# Patient Record
Sex: MALE | Race: WHITE | NOT HISPANIC OR LATINO | Employment: UNEMPLOYED | URBAN - NONMETROPOLITAN AREA
[De-identification: names, ages, dates, MRNs, and addresses within clinical notes are randomized per-mention and may not be internally consistent; named-entity substitution may affect disease eponyms.]

---

## 2023-10-09 LAB
NON-UH HIE ANION GAP: 11.4 (ref 6–15)
NON-UH HIE BLOOD UREA NITROGEN: 22 MG/DL (ref 7–25)
NON-UH HIE CALCIUM: 9.4 MG/DL (ref 8.6–10.3)
NON-UH HIE CARBON DIOXIDE: 29 MMOL/L (ref 21–31)
NON-UH HIE CHLORIDE: 103 MMOL/L (ref 98–107)
NON-UH HIE CREATININE: 1.33 MG/DL (ref 0.7–1.3)
NON-UH HIE ESTIMATED GFR: > 60
NON-UH HIE GLUCOSE: 114 MG/DL (ref 70–100)
NON-UH HIE POTASSIUM: 4.4 MMOL/L (ref 3.5–5.1)
NON-UH HIE SODIUM: 139 MMOL/L (ref 136–145)

## 2023-10-28 PROBLEM — Z79.899 HIGH RISK MEDICATION USE: Status: ACTIVE | Noted: 2023-10-28

## 2023-10-28 PROBLEM — G47.30 SLEEP APNEA: Status: ACTIVE | Noted: 2023-10-28

## 2023-10-28 PROBLEM — I10 PRIMARY HYPERTENSION: Status: ACTIVE | Noted: 2023-10-28

## 2023-10-28 PROBLEM — Z86.79 ATRIAL FIBRILLATION, CURRENTLY IN SINUS RHYTHM: Status: ACTIVE | Noted: 2023-10-28

## 2023-10-28 PROBLEM — E66.9 CLASS 2 OBESITY WITH BODY MASS INDEX (BMI) OF 36.0 TO 36.9 IN ADULT: Status: ACTIVE | Noted: 2023-10-28

## 2023-10-28 PROBLEM — I42.0 CARDIOMYOPATHY, DILATED, NONISCHEMIC (MULTI): Status: ACTIVE | Noted: 2023-10-28

## 2023-10-28 PROBLEM — Z78.9 NEVER SMOKED ANY SUBSTANCE: Status: ACTIVE | Noted: 2023-10-28

## 2023-10-28 PROBLEM — Z79.01 ANTICOAGULATED: Status: ACTIVE | Noted: 2023-10-28

## 2023-10-28 PROBLEM — R06.00 DYSPNEA: Status: ACTIVE | Noted: 2023-10-28

## 2023-10-28 PROBLEM — E66.812 CLASS 2 OBESITY WITH BODY MASS INDEX (BMI) OF 36.0 TO 36.9 IN ADULT: Status: ACTIVE | Noted: 2023-10-28

## 2023-10-28 RX ORDER — METOPROLOL SUCCINATE 100 MG/1
100 TABLET, EXTENDED RELEASE ORAL DAILY
COMMUNITY
End: 2023-10-30 | Stop reason: SDUPTHER

## 2023-10-28 RX ORDER — LOSARTAN POTASSIUM 100 MG/1
100 TABLET ORAL DAILY
COMMUNITY
End: 2023-10-30 | Stop reason: SDUPTHER

## 2023-10-28 RX ORDER — SPIRONOLACTONE 25 MG/1
0.5 TABLET ORAL DAILY
COMMUNITY
End: 2023-10-30 | Stop reason: SDUPTHER

## 2023-10-30 ENCOUNTER — OFFICE VISIT (OUTPATIENT)
Dept: CARDIOLOGY | Facility: CLINIC | Age: 43
End: 2023-10-30
Payer: COMMERCIAL

## 2023-10-30 VITALS
WEIGHT: 294 LBS | SYSTOLIC BLOOD PRESSURE: 132 MMHG | HEART RATE: 58 BPM | DIASTOLIC BLOOD PRESSURE: 84 MMHG | HEIGHT: 73 IN | BODY MASS INDEX: 38.97 KG/M2

## 2023-10-30 DIAGNOSIS — R73.9 HYPERGLYCEMIA: ICD-10-CM

## 2023-10-30 DIAGNOSIS — Z79.01 ANTICOAGULATED: ICD-10-CM

## 2023-10-30 DIAGNOSIS — Z79.899 MEDICATION COURSE CHANGED: ICD-10-CM

## 2023-10-30 DIAGNOSIS — Z86.79 ATRIAL FIBRILLATION, CURRENTLY IN SINUS RHYTHM: Primary | ICD-10-CM

## 2023-10-30 DIAGNOSIS — I10 PRIMARY HYPERTENSION: ICD-10-CM

## 2023-10-30 DIAGNOSIS — Z79.899 HIGH RISK MEDICATION USE: ICD-10-CM

## 2023-10-30 DIAGNOSIS — I42.0 CARDIOMYOPATHY, DILATED, NONISCHEMIC (MULTI): ICD-10-CM

## 2023-10-30 DIAGNOSIS — E66.01 CLASS 2 SEVERE OBESITY DUE TO EXCESS CALORIES WITH SERIOUS COMORBIDITY AND BODY MASS INDEX (BMI) OF 36.0 TO 36.9 IN ADULT (MULTI): ICD-10-CM

## 2023-10-30 PROCEDURE — 1036F TOBACCO NON-USER: CPT | Performed by: INTERNAL MEDICINE

## 2023-10-30 PROCEDURE — 3075F SYST BP GE 130 - 139MM HG: CPT | Performed by: INTERNAL MEDICINE

## 2023-10-30 PROCEDURE — 3079F DIAST BP 80-89 MM HG: CPT | Performed by: INTERNAL MEDICINE

## 2023-10-30 PROCEDURE — 3008F BODY MASS INDEX DOCD: CPT | Performed by: INTERNAL MEDICINE

## 2023-10-30 PROCEDURE — 99214 OFFICE O/P EST MOD 30 MIN: CPT | Performed by: INTERNAL MEDICINE

## 2023-10-30 RX ORDER — LOSARTAN POTASSIUM 100 MG/1
100 TABLET ORAL DAILY
Qty: 90 TABLET | Refills: 3 | Status: SHIPPED | OUTPATIENT
Start: 2023-10-30 | End: 2024-10-29

## 2023-10-30 RX ORDER — METOPROLOL SUCCINATE 100 MG/1
100 TABLET, EXTENDED RELEASE ORAL DAILY
Qty: 90 TABLET | Refills: 3 | Status: SHIPPED | OUTPATIENT
Start: 2023-10-30 | End: 2024-10-29

## 2023-10-30 RX ORDER — SPIRONOLACTONE 25 MG/1
12.5 TABLET ORAL DAILY
Qty: 45 TABLET | Refills: 3 | Status: SHIPPED | OUTPATIENT
Start: 2023-10-30 | End: 2024-10-29

## 2023-10-30 NOTE — PROGRESS NOTES
Last seen 5/2013 :    Subjective :     Interval review of systems is negative for chest discomfort pressure tightness heaviness palpitations lightheadedness orthopnea paroxysmal nocturnal dyspnea dependent edema or claudication TIA or CVA type symptoms or bleeding diathesis      History so Far :  1.EZEQUIEL guided electrical cardioversion of atrial fibrillation 2/23 in the setting of class III/IV biventricular systolic heart failure  2. Patient is currently functional class I  3. Atrial fibrillation with rapid ventricular rate, no recurrence on current medical therapy  4. Atrial fibrillation is a new diagnosis.  5. No regular medical care in the past  6. Uncontrolled hypertension  7. Probable obstructive sleep apnea  8. High risk medication-Eliquis  9. Financial constraints  10. Still volume overloaded on examination.  11. Type II myocardial infarction-subsequent episode of care  12. Increased BMI  13. Echocardiogram 2/9/2023-left atrial diameter 4.9 cm LV end-systolic dimension 4.8 cm patient was in atrial fibrillation during the study LVEF 25% with severe global hypokinesis moderate right ventricular systolic dysfunction grossly normal valves no pericardial effusion PA pressure not estimated.  14. Transesophageal echocardiogram February 2023-  15. Patient was electrically cardioverted to sinus rhythm using 200 J of synchronized DC current.  16. CKD stage IIIa, likely to improve with down titration of diuretic therapy.  17. Echocardiogram April 2023-LVEF 65% mild to moderate concentric left ventricular hypertrophy, LVEF improved from 25% in February 2023 up to 65%. Right ventricular systolic dysfunction noted previously has also resolved, and atrial fibrillation is longer no longer present. Left atrial diameter 3.9 cm LV end-systolic diameter 3.7 cm, trace mitral and tricuspid regurgitation no pericardial effusion IVC normal    Objective      Wt Readings from Last 3 Encounters:   10/30/23 133 kg (294 lb)   05/08/23  125 kg (276 lb)   03/13/23 131 kg (288 lb)            Physical Exam:    GENERAL APPEARANCE: in no acute distress.  CHEST: Symmetric and non-tender.  INTEGUMENT: Skin warm and dry  HEENT: No gross abnormalities identified.No pallor or scleral icterus.  NECK: Supple, no JVD, no bruit.   NEURO/PSHCY: Alert and oriented x3; appropriate behavior and responses and responses  LUNGS: Clear to auscultation bilaterally; normal respiratory effort.  HEART: Rate and rhythm regular with no evident murmur; no gallop appreciated.   ABDOMEN: Soft, non tender.  MUSCULOSKELETAL: No gross deformities.  EXTREMITIES: Warm  There is no edema noted.    Meds:  Current Outpatient Medications   Medication Instructions    apixaban (ELIQUIS) 5 mg, oral, 2 times daily    losartan (COZAAR) 100 mg, oral, Daily    metoprolol succinate XL (TOPROL-XL) 100 mg, oral, Daily, Do not crush or chew.    spironolactone (Aldactone) 25 mg tablet 0.5 tablets, oral, Daily          No Known Allergies    Basic metabolic profile from October 2023 showed glucose of 114 BUN 22 creatinine 1.3 GFR greater than 60 sodium 139 and potassium 4.4      Problem List:    Patient Active Problem List    Diagnosis Date Noted    Anticoagulated 10/28/2023    Atrial fibrillation, currently in sinus rhythm 10/28/2023    Cardiomyopathy, dilated, nonischemic (CMS/HCC) 10/28/2023    Class 2 obesity with body mass index (BMI) of 36.0 to 36.9 in adult 10/28/2023    Dyspnea 10/28/2023    High risk medication use 10/28/2023    Never smoked any substance 10/28/2023    Sleep apnea 10/28/2023    Primary hypertension 10/28/2023                 Assessment:       This is a patient with atrial fibrillation, and tachycardia mediated cardiomyopathy with class IV systolic left heart failure, who is currently class I/II, and his LV function has normalized.  Left atrial size is normal.  He has been on anticoagulation for several months.  His [id number]-KWAo score is probably 1 at this time  Blood pressure  is at target  He remains on appropriate medical therapy  His recent basic metabolic profile shows hyper glycemia.    Recommendations:  1.  Discontinue Eliquis  2.  Enteric-coated aspirin 81 mg p.o. daily  3.  1 year follow-up  4.  Comprehensive profile lipid profile hemoglobin A1c prior to next visit    Follow up : 1 year      Alida Pantoja MD

## 2023-11-01 NOTE — PATIENT INSTRUCTIONS
Please bring all medicines, vitamins, and herbal supplements with you when you come to the office.    Prescriptions will not be filled unless you are compliant with your follow up appointments or have a follow up appointment scheduled as per instruction of your physician. Refills should be requested at the time of your visit.   
Detail Level: Detailed
Quality 130: Documentation Of Current Medications In The Medical Record: Current Medications Documented
Quality 431: Preventive Care And Screening: Unhealthy Alcohol Use - Screening: Patient not identified as an unhealthy alcohol user when screened for unhealthy alcohol use using a systematic screening method
Quality 226: Preventive Care And Screening: Tobacco Use: Screening And Cessation Intervention: Patient screened for tobacco use and is an ex/non-smoker

## 2024-07-11 DIAGNOSIS — Z86.79 ATRIAL FIBRILLATION, CURRENTLY IN SINUS RHYTHM: ICD-10-CM

## 2024-07-11 DIAGNOSIS — I10 PRIMARY HYPERTENSION: ICD-10-CM

## 2024-07-11 DIAGNOSIS — I42.0 CARDIOMYOPATHY, DILATED, NONISCHEMIC (MULTI): ICD-10-CM

## 2024-07-16 RX ORDER — METOPROLOL SUCCINATE 100 MG/1
100 TABLET, EXTENDED RELEASE ORAL DAILY
Qty: 90 TABLET | Refills: 3 | Status: SHIPPED | OUTPATIENT
Start: 2024-07-16 | End: 2025-07-16

## 2024-10-24 LAB
NON-UH HIE ALANINE AMINOTRANSFERASE: 32 U/L (ref 7–52)
NON-UH HIE ALBUMIN LEVEL: 4.3 G/DL (ref 3.5–5.7)
NON-UH HIE ALBUMIN/GLOBULIN RATIO: 1.6
NON-UH HIE ALKALINE PHOSPHATASE: 63 U/L (ref 34–104)
NON-UH HIE ANION GAP: 14.1 (ref 6–15)
NON-UH HIE ASPARTATE AMINO TRANSFERASE: 34 U/L (ref 13–39)
NON-UH HIE BILIRUBIN,TOTAL: 0.6 MG/DL (ref 0.3–1)
NON-UH HIE BLOOD UREA NITROGEN: 30 MG/DL (ref 7–25)
NON-UH HIE CALCIUM: 9.8 MG/DL (ref 8.6–10.3)
NON-UH HIE CARBON DIOXIDE: 26.9 MMOL/L (ref 21–31)
NON-UH HIE CHLORIDE: 102 MMOL/L (ref 98–107)
NON-UH HIE CREATININE: 1.36 MG/DL (ref 0.7–1.3)
NON-UH HIE ESTIMATED AVERAGE GLUCOSE: 126 MG/DL
NON-UH HIE ESTIMATED GFR: > 60
NON-UH HIE GLOBULIN: 2.7 G/DL
NON-UH HIE GLUCOSE: 92 MG/DL (ref 70–100)
NON-UH HIE HEMOGLOBIN A1C: 6 % (ref 4.3–5.6)
NON-UH HIE POTASSIUM: 4 MMOL/L (ref 3.5–5.1)
NON-UH HIE SODIUM: 139 MMOL/L (ref 136–145)
NON-UH HIE TOTAL PROTEIN: 7 G/DL (ref 6.4–8.9)

## 2024-10-31 ENCOUNTER — APPOINTMENT (OUTPATIENT)
Dept: CARDIOLOGY | Facility: CLINIC | Age: 44
End: 2024-10-31
Payer: COMMERCIAL

## 2024-10-31 VITALS
DIASTOLIC BLOOD PRESSURE: 100 MMHG | SYSTOLIC BLOOD PRESSURE: 138 MMHG | HEIGHT: 73 IN | BODY MASS INDEX: 39.23 KG/M2 | WEIGHT: 296 LBS | HEART RATE: 60 BPM

## 2024-10-31 DIAGNOSIS — I10 PRIMARY HYPERTENSION: ICD-10-CM

## 2024-10-31 DIAGNOSIS — Z86.79 ATRIAL FIBRILLATION, CURRENTLY IN SINUS RHYTHM: ICD-10-CM

## 2024-10-31 DIAGNOSIS — R73.03 PREDIABETES: ICD-10-CM

## 2024-10-31 DIAGNOSIS — Z78.9 NEVER SMOKED ANY SUBSTANCE: ICD-10-CM

## 2024-10-31 PROCEDURE — 3080F DIAST BP >= 90 MM HG: CPT | Performed by: INTERNAL MEDICINE

## 2024-10-31 PROCEDURE — 99214 OFFICE O/P EST MOD 30 MIN: CPT | Performed by: INTERNAL MEDICINE

## 2024-10-31 PROCEDURE — 1036F TOBACCO NON-USER: CPT | Performed by: INTERNAL MEDICINE

## 2024-10-31 PROCEDURE — 3008F BODY MASS INDEX DOCD: CPT | Performed by: INTERNAL MEDICINE

## 2024-10-31 PROCEDURE — 3074F SYST BP LT 130 MM HG: CPT | Performed by: INTERNAL MEDICINE

## 2024-10-31 RX ORDER — METOPROLOL SUCCINATE 100 MG/1
100 TABLET, EXTENDED RELEASE ORAL DAILY
Qty: 90 TABLET | Refills: 3 | Status: SHIPPED | OUTPATIENT
Start: 2024-10-31 | End: 2025-10-31

## 2024-10-31 RX ORDER — SPIRONOLACTONE 25 MG/1
12.5 TABLET ORAL DAILY
Qty: 45 TABLET | Refills: 3 | Status: SHIPPED | OUTPATIENT
Start: 2024-10-31 | End: 2025-10-31

## 2024-10-31 RX ORDER — ASPIRIN 81 MG/1
81 TABLET ORAL DAILY
COMMUNITY

## 2024-10-31 RX ORDER — LOSARTAN POTASSIUM 100 MG/1
100 TABLET ORAL DAILY
Qty: 90 TABLET | Refills: 3 | Status: SHIPPED | OUTPATIENT
Start: 2024-10-31 | End: 2025-10-31

## 2025-02-18 ENCOUNTER — TELEPHONE (OUTPATIENT)
Dept: CARDIOLOGY | Facility: CLINIC | Age: 45
End: 2025-02-18
Payer: COMMERCIAL

## 2025-02-18 RX ORDER — METOPROLOL SUCCINATE 100 MG/1
150 TABLET, EXTENDED RELEASE ORAL DAILY
COMMUNITY

## 2025-02-18 NOTE — TELEPHONE ENCOUNTER
"Call from patient to schedule an ER follow up appointment. Pt was in the ER this morning due to an \"a-fib flare up, with shortness of breath\".  Metoprolol was increased to 150mg daily at that time.  Pt states SOB is gone, and that his heart rate is decreasing.    Pt was advised to follow up with cardiologist.     Med list updated.     Please schedule patient for ER follow up appointment.     "

## 2025-02-20 ENCOUNTER — OFFICE VISIT (OUTPATIENT)
Dept: CARDIOLOGY | Facility: CLINIC | Age: 45
End: 2025-02-20
Payer: COMMERCIAL

## 2025-02-20 VITALS
HEIGHT: 73 IN | WEIGHT: 297 LBS | SYSTOLIC BLOOD PRESSURE: 126 MMHG | DIASTOLIC BLOOD PRESSURE: 88 MMHG | HEART RATE: 107 BPM | BODY MASS INDEX: 39.36 KG/M2

## 2025-02-20 DIAGNOSIS — Z79.899 ENCOUNTER FOR LONG-TERM CURRENT USE OF HIGH RISK MEDICATION: ICD-10-CM

## 2025-02-20 DIAGNOSIS — I10 PRIMARY HYPERTENSION: ICD-10-CM

## 2025-02-20 DIAGNOSIS — G47.30 SLEEP APNEA, UNSPECIFIED TYPE: ICD-10-CM

## 2025-02-20 DIAGNOSIS — I48.0 PAROXYSMAL ATRIAL FIBRILLATION (MULTI): Primary | ICD-10-CM

## 2025-02-20 DIAGNOSIS — Z86.79 ATRIAL FIBRILLATION, CURRENTLY IN SINUS RHYTHM: ICD-10-CM

## 2025-02-20 DIAGNOSIS — Z78.9 NEVER SMOKED ANY SUBSTANCE: ICD-10-CM

## 2025-02-20 DIAGNOSIS — Z79.899 MEDICATION COURSE CHANGED: ICD-10-CM

## 2025-02-20 PROCEDURE — 3074F SYST BP LT 130 MM HG: CPT | Performed by: INTERNAL MEDICINE

## 2025-02-20 PROCEDURE — 93000 ELECTROCARDIOGRAM COMPLETE: CPT | Performed by: INTERNAL MEDICINE

## 2025-02-20 PROCEDURE — 99214 OFFICE O/P EST MOD 30 MIN: CPT | Performed by: INTERNAL MEDICINE

## 2025-02-20 PROCEDURE — 1036F TOBACCO NON-USER: CPT | Performed by: INTERNAL MEDICINE

## 2025-02-20 PROCEDURE — 3079F DIAST BP 80-89 MM HG: CPT | Performed by: INTERNAL MEDICINE

## 2025-02-20 PROCEDURE — 3008F BODY MASS INDEX DOCD: CPT | Performed by: INTERNAL MEDICINE

## 2025-02-20 RX ORDER — LANOLIN ALCOHOL/MO/W.PET/CERES
400 CREAM (GRAM) TOPICAL DAILY
Qty: 90 TABLET | Refills: 3 | Status: SHIPPED | OUTPATIENT
Start: 2025-02-20 | End: 2026-02-20

## 2025-02-20 NOTE — PROGRESS NOTES
Most recent office visit October 2024.    Subjective :   Recurrence of palpitations.  Associated with shortness of breath.  No chest pressure tightness heaviness lightheadedness presyncope or syncope.  Presented to the emergency department at [facility] on 2/18/2025, at which time atrial fibrillation with rapid ventricular rate was diagnosed.  Heart rate was as high as 165 bpm.  Patient was given 10 mg of IV propranolol.  Metoprolol dose was increased from 100 mg daily to 150 mg daily  He feels slightly better.  EKG today also shows atrial fibrillation ventricular rate 107 bpm.  12 point ROS is negative or non contributory except as noted.   History so Far :    1.EZEQUIEL guided electrical cardioversion of atrial fibrillation 2/23 in the setting of class III/IV biventricular systolic heart failure  2. Patient is currently functional class I  3. Atrial fibrillation with rapid ventricular rate, no recurrence till 2/18/2025  4. Atrial fibrillation is a new diagnosis.  5. No regular medical care in the past  6.  Primary hypertension  7. Probable obstructive sleep apnea  8.  [id number]-AKQd score 1  9. Financial constraints  10.  Not volume overloaded on examination.  11. Type II myocardial infarction-subsequent episode of care  12. Increased BMI  13. Echocardiogram 2/9/2023-left atrial diameter 4.9 cm LV end-systolic dimension 4.8 cm patient was in atrial fibrillation during the study LVEF 25% with severe global hypokinesis moderate right ventricular systolic dysfunction grossly normal valves no pericardial effusion PA pressure not estimated.  14. Transesophageal echocardiogram February 2023-  15. Patient was electrically cardioverted to sinus rhythm using 200 J of synchronized DC current.  16. CKD stage IIIa,  resolved  17. Echocardiogram April 2023-LVEF 65% mild to moderate concentric left ventricular hypertrophy, LVEF improved from 25% in February 2023 up to 65%. Right ventricular systolic dysfunction  "noted previously has also resolved, and atrial fibrillation is longer no longer present. Left atrial diameter 3.9 cm LV end-systolic diameter 3.7 cm, trace mitral and tricuspid regurgitation no pericardial effusion IVC normal    Objective   Wt Readings from Last 3 Encounters:   02/20/25 135 kg (297 lb)   10/31/24 134 kg (296 lb)   10/30/23 133 kg (294 lb)            Vitals:    02/20/25 0903   BP: 126/88   BP Location: Right arm   Patient Position: Sitting   Pulse: 107   Weight: 135 kg (297 lb)   Height: 1.854 m (6' 1\")                Physical Exam:    GENERAL APPEARANCE: in no acute distress.  CHEST: Symmetric and non-tender.  INTEGUMENT: Skin warm and dry  HEENT: No gross abnormalities identified.No pallor or scleral icterus.  NECK: Supple, no JVD, no bruit.   NEURO/PSHCY: Alert and oriented x3; appropriate behavior and responses and responses  LUNGS: Clear to auscultation bilaterally; normal respiratory effort.  HEART: Rate and rhythm irregular with no evident murmur; no gallop appreciated.   ABDOMEN: Soft, non tender.  MUSCULOSKELETAL: No gross deformities.  EXTREMITIES: Warm  There is no edema noted.    Meds:  Current Outpatient Medications   Medication Instructions    apixaban (ELIQUIS) 5 mg, oral, 2 times daily    aspirin 81 mg, Daily    losartan (COZAAR) 100 mg, oral, Daily    magnesium oxide (MAG-OX) 400 mg, oral, Daily    metoprolol succinate XL (TOPROL-XL) 150 mg, Daily    spironolactone (ALDACTONE) 12.5 mg, oral, Daily          No Known Allergies          LABS:              2/18/2025-hemoglobin 16.4 hematocrit 47.7 platelets 1 64,000 sodium 133 potassium 4.0 BUN 28 creatinine 1.4 GFR greater than 60      Patient Active Problem List    Diagnosis Date Noted    Paroxysmal atrial fibrillation (Multi) 02/20/2025    BMI 39.0-39.9,adult 10/31/2024    Encounter for long-term current use of high risk medication 10/30/2023    Hyperglycemia 10/30/2023    Anticoagulated 10/28/2023    Atrial fibrillation, currently in " sinus rhythm 10/28/2023    Cardiomyopathy, dilated, nonischemic (Multi) 10/28/2023    Class 2 obesity with body mass index (BMI) of 36.0 to 36.9 in adult 10/28/2023    Dyspnea 10/28/2023    High risk medication use 10/28/2023    Never smoked any substance 10/28/2023    Sleep apnea 10/28/2023    Primary hypertension 10/28/2023                 Assessment:    1. Paroxysmal atrial fibrillation (Multi)        2. Primary hypertension        3. Medication course changed        4. Encounter for long-term current use of high risk medication        5. Sleep apnea, unspecified type        6. BMI 39.0-39.9,adult        7. Never smoked any substance        8. Atrial fibrillation, currently in sinus rhythm  Transesophageal Echo (EZEQUIEL)    Cardioversion External    Follow Up In Cardiology    apixaban (Eliquis) 5 mg tablet    magnesium oxide (Mag-Ox) 400 mg (241.3 mg magnesium) tablet    ECG 12 Lead      Clinical decision making:  Symptomatic atrial fibrillation has recurred.  [id number] score is 1  No obvious precipitating factor identified  Last time patient was in atrial fibrillation was in 2023 at which time he underwent EZEQUIEL guided electrical cardioversion  Previously when patient was in atrial fibrillation he had class IV congestive heart failure and cardiomyopathy.  Agree with the up titration of metoprolol succinate to 150 mg daily  Proceed with EZEQUIEL guided electrical cardioversion  Anticoagulation should be initiated and continued for at least 4 weeks after cardioversion.  Eliquis 5 mg p.o. twice daily, patient understands rationale pros and cons.  Magnesium oxide 400 mg p.o. daily  Discussed EZEQUIEL, electrical cardioversion, rationale, procedure, risks and benefits and alternatives.  Patient has been through these procedures and understands.  Risks of EZEQUIEL that were discussed included esophageal irritation minor bleeding and in extremely rare instances of esophageal perforation ,risks of cardioversion that were  discussed  included but were not limited to precipitation of more malignant dysrhythmias, precipitation of bradycardia, we have tried to schedule procedure in the near future.    Follow up :  6 to 8  weeks        Provider Attestation - Scribe documentation    All medical record entries made by the Scribe were at my direction and personally dictated by me. I have reviewed the chart and agree that the record accurately reflects my personal performance of the history, physical exam, discussion and plan.

## 2025-02-21 ENCOUNTER — TELEPHONE (OUTPATIENT)
Dept: CARDIOLOGY | Facility: CLINIC | Age: 45
End: 2025-02-21
Payer: COMMERCIAL

## 2025-02-21 NOTE — TELEPHONE ENCOUNTER
Cardioversion 02517 DX: Z86.79 and Transesophageal Echo 57324 DX: Z86.79 at [facility] does not require prior auth per Garcia VALDEZ at [facility]-call reference#[id number].

## 2025-02-27 ENCOUNTER — TELEPHONE (OUTPATIENT)
Dept: CARDIOLOGY | Facility: CLINIC | Age: 45
End: 2025-02-27
Payer: COMMERCIAL

## 2025-02-27 DIAGNOSIS — Z86.79 ATRIAL FIBRILLATION, CURRENTLY IN SINUS RHYTHM: ICD-10-CM

## 2025-02-27 DIAGNOSIS — I42.0 CARDIOMYOPATHY, DILATED, NONISCHEMIC (MULTI): ICD-10-CM

## 2025-02-27 DIAGNOSIS — R06.00 DYSPNEA, UNSPECIFIED TYPE: ICD-10-CM

## 2025-02-27 DIAGNOSIS — I48.0 PAROXYSMAL ATRIAL FIBRILLATION (MULTI): ICD-10-CM

## 2025-02-27 DIAGNOSIS — Z79.899 HIGH RISK MEDICATION USE: ICD-10-CM

## 2025-02-27 DIAGNOSIS — I10 PRIMARY HYPERTENSION: ICD-10-CM

## 2025-02-27 NOTE — TELEPHONE ENCOUNTER
[facility] requested updated order with additional diagnosis codes for EZEQUIEL. Order updated and faxed to [id number]

## 2025-02-28 LAB
NON-UH HIE ANION GAP: 14.1 MEQ/L (ref 6–15)
NON-UH HIE CARBON DIOXIDE: 23.3 MMOL/L (ref 21–31)
NON-UH HIE CHLORIDE: 105 MMOL/L (ref 98–107)
NON-UH HIE POTASSIUM: 4.4 MMOL/L (ref 3.5–5.1)
NON-UH HIE SODIUM: 138 MMOL/L (ref 136–145)

## 2025-03-14 ENCOUNTER — APPOINTMENT (OUTPATIENT)
Dept: CARDIOLOGY | Facility: CLINIC | Age: 45
End: 2025-03-14
Payer: COMMERCIAL

## 2025-03-16 DIAGNOSIS — Z86.79 ATRIAL FIBRILLATION, CURRENTLY IN SINUS RHYTHM: ICD-10-CM

## 2025-03-17 ENCOUNTER — APPOINTMENT (OUTPATIENT)
Dept: CARDIOLOGY | Facility: CLINIC | Age: 45
End: 2025-03-17
Payer: COMMERCIAL

## 2025-03-17 VITALS
HEIGHT: 73 IN | SYSTOLIC BLOOD PRESSURE: 118 MMHG | BODY MASS INDEX: 39.89 KG/M2 | DIASTOLIC BLOOD PRESSURE: 70 MMHG | WEIGHT: 301 LBS | HEART RATE: 54 BPM

## 2025-03-17 DIAGNOSIS — Z79.01 ANTICOAGULATED: ICD-10-CM

## 2025-03-17 DIAGNOSIS — I42.0 CARDIOMYOPATHY, DILATED, NONISCHEMIC (MULTI): ICD-10-CM

## 2025-03-17 DIAGNOSIS — I10 PRIMARY HYPERTENSION: ICD-10-CM

## 2025-03-17 DIAGNOSIS — Z86.79 ATRIAL FIBRILLATION, CURRENTLY IN SINUS RHYTHM: ICD-10-CM

## 2025-03-17 DIAGNOSIS — Z78.9 NEVER SMOKED ANY SUBSTANCE: ICD-10-CM

## 2025-03-17 DIAGNOSIS — Z79.899 HIGH RISK MEDICATION USE: ICD-10-CM

## 2025-03-17 DIAGNOSIS — Z79.899 MEDICATION COURSE CHANGED: ICD-10-CM

## 2025-03-17 PROBLEM — E66.812 CLASS 2 OBESITY WITH BODY MASS INDEX (BMI) OF 36.0 TO 36.9 IN ADULT: Status: RESOLVED | Noted: 2023-10-28 | Resolved: 2025-03-17

## 2025-03-17 PROCEDURE — 3078F DIAST BP <80 MM HG: CPT | Performed by: INTERNAL MEDICINE

## 2025-03-17 PROCEDURE — 93000 ELECTROCARDIOGRAM COMPLETE: CPT | Performed by: INTERNAL MEDICINE

## 2025-03-17 PROCEDURE — 99214 OFFICE O/P EST MOD 30 MIN: CPT | Performed by: INTERNAL MEDICINE

## 2025-03-17 PROCEDURE — 3074F SYST BP LT 130 MM HG: CPT | Performed by: INTERNAL MEDICINE

## 2025-03-17 PROCEDURE — 1036F TOBACCO NON-USER: CPT | Performed by: INTERNAL MEDICINE

## 2025-03-17 PROCEDURE — 3008F BODY MASS INDEX DOCD: CPT | Performed by: INTERNAL MEDICINE

## 2025-03-17 RX ORDER — LOSARTAN POTASSIUM 100 MG/1
100 TABLET ORAL DAILY
Qty: 90 TABLET | Refills: 1 | Status: SHIPPED | OUTPATIENT
Start: 2025-03-17 | End: 2026-03-17

## 2025-03-17 RX ORDER — METOPROLOL SUCCINATE 100 MG/1
100 TABLET, EXTENDED RELEASE ORAL DAILY
Qty: 90 TABLET | Refills: 1 | Status: SHIPPED | OUTPATIENT
Start: 2025-03-17 | End: 2026-03-17

## 2025-03-17 RX ORDER — APIXABAN 5 MG/1
TABLET, FILM COATED ORAL
Refills: 0 | OUTPATIENT
Start: 2025-03-17

## 2025-03-17 RX ORDER — SPIRONOLACTONE 25 MG/1
12.5 TABLET ORAL DAILY
Qty: 45 TABLET | Refills: 1 | Status: SHIPPED | OUTPATIENT
Start: 2025-03-17 | End: 2026-03-17

## 2025-03-17 ASSESSMENT — ENCOUNTER SYMPTOMS
ENDOCRINE NEGATIVE: 1
ABDOMINAL PAIN: 0
CHILLS: 0
SPEECH DIFFICULTY: 0
VOMITING: 0
TROUBLE SWALLOWING: 0
EYES NEGATIVE: 1
CHOKING: 0
JOINT SWELLING: 0
FEVER: 0
HEMATOLOGIC/LYMPHATIC NEGATIVE: 1
ALLERGIC/IMMUNOLOGIC NEGATIVE: 1
STRIDOR: 0
DIAPHORESIS: 0
HEMATURIA: 0
EYE DISCHARGE: 0

## 2025-03-17 NOTE — PATIENT INSTRUCTIONS
Please bring all medicines, vitamins, and herbal supplements with you when you come to the office.    Prescriptions will not be filled unless you are compliant with your follow up appointments or have a follow up appointment scheduled as per instruction of your physician. Refills should be requested at the time of your visit.     EKG done in office today     BMI was above normal measurement. Current weight: 137 kg (301 lb)  Weight change since last visit (-) denotes wt loss 4 lbs   Weight loss needed to achieve BMI 25: 111.9 Lbs  Weight loss needed to achieve BMI 30: 74.1 Lbs  Provided instructions on dietary changes.

## 2025-03-17 NOTE — PROGRESS NOTES
Chief Complaint:    Chief Complaint   Patient presents with    Follow-up     Follow up Cardioversion Paroxysmal atrial fibrillation     Patient was seen most recently 2/20/2025.  Electrical cardioversion was arranged.  This is a follow-up visit.  Subjective : Doing well, feels much improved, energy level is normal now, follows heart rate with Velma device    Review of Systems   Constitutional:  Negative for chills, diaphoresis and fever.   HENT: Negative.  Negative for drooling, ear discharge, mouth sores and trouble swallowing.    Eyes: Negative.  Negative for discharge.   Respiratory:  Negative for choking and stridor.    Cardiovascular:         Interval review of systems is negative for chest discomfort pressure tightness heaviness palpitations lightheadedness orthopnea paroxysmal nocturnal dyspnea dependent edema or claudication TIA or CVA type symptoms or bleeding diathesis   Gastrointestinal:  Negative for abdominal pain and vomiting.   Endocrine: Negative.  Negative for cold intolerance and heat intolerance.   Genitourinary: Negative.  Negative for hematuria.   Musculoskeletal:  Negative for joint swelling.   Skin:  Negative for pallor.   Allergic/Immunologic: Negative.  Negative for immunocompromised state.   Neurological:  Negative for syncope and speech difficulty.   Hematological: Negative.    Psychiatric/Behavioral:  Negative for suicidal ideas.         History so Far :    1.EZEQUIEL guided electrical cardioversion of atrial fibrillation 2/23 in the setting of class III/IV biventricular systolic heart failure  2. Patient is currently functional class I  3. Atrial fibrillation with rapid ventricular rate, no recurrence till 2/18/2025  4. Atrial fibrillation is a new diagnosis.  5. No regular medical care in the past  6.  Primary hypertension  7. Probable obstructive sleep apnea  8.  [id number] score 1  9. Financial constraints  10.  Not volume overloaded on examination.  11. Type II myocardial  "infarction-subsequent episode of care  12. Increased BMI  13. Echocardiogram 2/9/2023-left atrial diameter 4.9 cm LV end-systolic dimension 4.8 cm patient was in atrial fibrillation during the study LVEF 25% with severe global hypokinesis moderate right ventricular systolic dysfunction grossly normal valves no pericardial effusion PA pressure not estimated.  14. Transesophageal echocardiogram February 2023-  15. Patient was electrically cardioverted to sinus rhythm using 200 J of synchronized DC current.  16. CKD stage IIIa,  resolved  17. Echocardiogram April 2023-LVEF 65% mild to moderate concentric left ventricular hypertrophy, LVEF improved from 25% in February 2023 up to 65%. Right ventricular systolic dysfunction noted previously has also resolved, and atrial fibrillation is longer no longer present. Left atrial diameter 3.9 cm LV end-systolic diameter 3.7 cm, trace mitral and tricuspid regurgitation no pericardial effusion IVC normal  18.  Electrical cardioversion February 28, 2025-converted to sinus bradycardia at 53 bpm.    Objective   Wt Readings from Last 3 Encounters:   03/17/25 137 kg (301 lb)   02/20/25 135 kg (297 lb)   10/31/24 134 kg (296 lb)        Vitals:    03/17/25 1245   BP: 118/70   BP Location: Right arm   Patient Position: Sitting   Pulse: 54   Weight: 137 kg (301 lb)   Height: 1.854 m (6' 1\")           Physical Exam:    GENERAL APPEARANCE: in no acute distress.  CHEST: Symmetric and non-tender.  INTEGUMENT: Skin warm and dry  HEENT: No gross abnormalities identified.No pallor or scleral icterus.  NECK: Supple, no JVD, no bruit.   NEURO/PSHCY: Alert and oriented x3; appropriate behavior and responses and responses  LUNGS: Clear to auscultation bilaterally; normal respiratory effort.  HEART: Rate and rhythm regular with no evident murmur; no gallop appreciated.   ABDOMEN: Soft, non tender.  MUSCULOSKELETAL: No gross deformities.  EXTREMITIES: Warm  There is no edema noted.    Meds:  Current " Outpatient Medications   Medication Instructions    apixaban (ELIQUIS) 5 mg, oral, 2 times daily    aspirin 81 mg, Daily    losartan (COZAAR) 100 mg, oral, Daily    magnesium oxide (MAG-OX) 400 mg, oral, Daily    metoprolol succinate XL (TOPROL-XL) 100 mg, oral, Daily, Do not crush or chew.    spironolactone (ALDACTONE) 12.5 mg, oral, Daily          No Known Allergies      LABS:        Reviewed all available pertinent laboratory data and diagnostic testing results that occurred after the last office visit with me  On 2/28/2025, sodium was 138 potassium 4.4              Assessment:    1. Medication course changed        2. Atrial fibrillation, currently in sinus rhythm  metoprolol succinate XL (Toprol-XL) 100 mg 24 hr tablet    ECG 12 Lead    apixaban (Eliquis) 5 mg tablet      3. High risk medication use        4. Anticoagulated        5. Primary hypertension  metoprolol succinate XL (Toprol-XL) 100 mg 24 hr tablet    losartan (Cozaar) 100 mg tablet    spironolactone (Aldactone) 25 mg tablet      6. Cardiomyopathy, dilated, nonischemic (Multi)  metoprolol succinate XL (Toprol-XL) 100 mg 24 hr tablet    Follow Up In Cardiology      7. BMI 39.0-39.9,adult        8. Never smoked any substance             Clinical Decision Making:  Patient is sustaining sinus rhythm, actually sinus bradycardia on EKG.  [id number] score is 1  No obvious precipitating factor identified  Last time patient was in atrial fibrillation was in 2023 at which time he underwent EZEQUIEL guided electrical cardioversion  Previously when patient was in atrial fibrillation he had class IV congestive heart failure and cardiomyopathy.  In view of his bradycardia, we will reduce the metoprolol succinate from 150 mg daily to 100 mg daily  Cancel previously scheduled appointment with me for April 2, 2025.  Continue to keep an eye on heart rate via Velma  testing.  Now it is running 49 to 53 bpm.  Patient however is not dizzy or short of breath or fatigue.  He  feels good  Refill Eliquis for 3 months, if no more atrial fibrillation, can consider discontinuing at next office visit.    Follow up : 3 months        IEmilia LPN   am scribing for, and in the presence of Dr. Alida Pantoja MD, FACC.       I, Dr. Alida Pantoja MD, FACC, personally performed the services described in the documentation as scribed by Emilia PRESTON LPN   in my presence, and confirm it is both accurate and complete.

## 2025-04-03 ENCOUNTER — APPOINTMENT (OUTPATIENT)
Dept: CARDIOLOGY | Facility: CLINIC | Age: 45
End: 2025-04-03
Payer: COMMERCIAL

## 2025-06-09 ENCOUNTER — APPOINTMENT (OUTPATIENT)
Dept: CARDIOLOGY | Facility: CLINIC | Age: 45
End: 2025-06-09
Payer: COMMERCIAL

## 2025-06-18 ENCOUNTER — APPOINTMENT (OUTPATIENT)
Dept: CARDIOLOGY | Facility: CLINIC | Age: 45
End: 2025-06-18
Payer: COMMERCIAL

## 2025-06-30 ENCOUNTER — APPOINTMENT (OUTPATIENT)
Dept: CARDIOLOGY | Facility: CLINIC | Age: 45
End: 2025-06-30
Payer: COMMERCIAL

## 2025-06-30 VITALS
WEIGHT: 283 LBS | SYSTOLIC BLOOD PRESSURE: 118 MMHG | BODY MASS INDEX: 37.51 KG/M2 | HEIGHT: 73 IN | HEART RATE: 58 BPM | DIASTOLIC BLOOD PRESSURE: 82 MMHG

## 2025-06-30 DIAGNOSIS — I42.0 CARDIOMYOPATHY, DILATED, NONISCHEMIC (MULTI): ICD-10-CM

## 2025-06-30 DIAGNOSIS — Z86.79 ATRIAL FIBRILLATION, CURRENTLY IN SINUS RHYTHM: ICD-10-CM

## 2025-06-30 DIAGNOSIS — Z78.9 NEVER SMOKED ANY SUBSTANCE: ICD-10-CM

## 2025-06-30 DIAGNOSIS — Z79.899 MEDICATION COURSE CHANGED: ICD-10-CM

## 2025-06-30 PROCEDURE — 1036F TOBACCO NON-USER: CPT | Performed by: INTERNAL MEDICINE

## 2025-06-30 PROCEDURE — 3008F BODY MASS INDEX DOCD: CPT | Performed by: INTERNAL MEDICINE

## 2025-06-30 PROCEDURE — 99214 OFFICE O/P EST MOD 30 MIN: CPT | Performed by: INTERNAL MEDICINE

## 2025-06-30 PROCEDURE — 3079F DIAST BP 80-89 MM HG: CPT | Performed by: INTERNAL MEDICINE

## 2025-06-30 PROCEDURE — 3074F SYST BP LT 130 MM HG: CPT | Performed by: INTERNAL MEDICINE

## 2025-06-30 NOTE — PATIENT INSTRUCTIONS
Please bring all medicines, vitamins, and herbal supplements with you when you come to the office.    Prescriptions will not be filled unless you are compliant with your follow up appointments or have a follow up appointment scheduled as per instruction of your physician. Refills should be requested at the time of your visit.     BMI was above normal measurement. Current weight: 128 kg (283 lb)  Weight change since last visit (-) denotes wt loss -18 lbs   Weight loss needed to achieve BMI 25: 93.9 Lbs  Weight loss needed to achieve BMI 30: 56.1 Lbs  Provided instructions on dietary changes  Provided instructions on exercise.    STOP Eliquis.

## 2025-06-30 NOTE — PROGRESS NOTES
Chief Complaint:    Chief Complaint   Patient presents with    Follow-up     3 month, cardiomyopathy     At most recent office visit 3 months ago, we down titrated the metoprolol succinate from 150 mg daily to 100 mg daily.  Patient was asked to keep an eye on his heart rate on his Velma device.  We said we would consider discontinuing anticoagulation if he had no further bouts of atrial fibrillation.  His [id number] score is 1.  Subjective :     Review of Systems interval review of systems is negative for chest discomfort pressure tightness heaviness palpitations lightheadedness orthopnea paroxysmal nocturnal dyspnea dependent edema or claudication TIA or CVA type symptoms or bleeding diathesis.  12 point review of systems is otherwise negative or noncontributory.    History so Far :    1.EZEQUIEL guided electrical cardioversion of atrial fibrillation 2/23 in the setting of class III/IV biventricular systolic heart failure  2. Patient is currently functional class I  3. Atrial fibrillation with rapid ventricular rate, no recurrence till 2/18/2025  4. Atrial fibrillation is a new diagnosis.  5. No regular medical care in the past  6.  Primary hypertension  7. Probable obstructive sleep apnea  8.  [id number] score 1  9. Financial constraints  10.  Not volume overloaded on examination.  11. Type II myocardial infarction-subsequent episode of care  12. Increased BMI  13. Echocardiogram 2/9/2023-left atrial diameter 4.9 cm LV end-systolic dimension 4.8 cm patient was in atrial fibrillation during the study LVEF 25% with severe global hypokinesis moderate right ventricular systolic dysfunction grossly normal valves no pericardial effusion PA pressure not estimated.  14. Transesophageal echocardiogram February 2023-  15. Patient was electrically cardioverted to sinus rhythm using 200 J of synchronized DC current.  16. CKD stage IIIa,  resolved  17. Echocardiogram April 2023-LVEF 65% mild to moderate concentric left  "ventricular hypertrophy, LVEF improved from 25% in February 2023 up to 65%. Right ventricular systolic dysfunction noted previously has also resolved, and atrial fibrillation is longer no longer present. Left atrial diameter 3.9 cm LV end-systolic diameter 3.7 cm, trace mitral and tricuspid regurgitation no pericardial effusion IVC normal  18.  Electrical cardioversion February 28, 2025-converted to sinus bradycardia at 53 bpm.       Objective   Wt Readings from Last 3 Encounters:   06/30/25 128 kg (283 lb)   03/17/25 137 kg (301 lb)   02/20/25 135 kg (297 lb)        Vitals:    06/30/25 1347   BP: 118/82   BP Location: Right arm   Patient Position: Sitting   Pulse: 58   Weight: 128 kg (283 lb)   Height: 1.854 m (6' 1\")           Physical Exam:    GENERAL APPEARANCE: in no acute distress.  CHEST: Symmetric and non-tender.  INTEGUMENT: Skin warm and dry  HEENT: No gross abnormalities identified.No pallor or scleral icterus.  NECK: Supple, no JVD, no bruit.   NEURO/PSHCY: Alert and oriented x3; appropriate behavior and responses and responses  LUNGS: Clear to auscultation bilaterally; normal respiratory effort.  HEART: Rate and rhythm regular with no evident murmur; no gallop appreciated.   ABDOMEN: Soft, non tender.  MUSCULOSKELETAL: No gross deformities.  EXTREMITIES: Warm  There is no edema noted.    Meds:  Current Outpatient Medications   Medication Instructions    aspirin 81 mg, Daily    losartan (COZAAR) 100 mg, oral, Daily    magnesium oxide (MAG-OX) 400 mg, oral, Daily    metoprolol succinate XL (TOPROL-XL) 100 mg, oral, Daily, Do not crush or chew.    spironolactone (ALDACTONE) 12.5 mg, oral, Daily        Allergies:  Patient has no known allergies.      LABS:      Testing Reviewed      Reviewed all available pertinent laboratory data and diagnostic testing results that occurred after the last office visit with me                Assessment:    1. Cardiomyopathy, dilated, nonischemic (Multi)  Follow Up In " Cardiology      2. Body mass index (BMI) of 37.0 to 37.9 in adult        3. Never smoked any substance        4. Atrial fibrillation, currently in sinus rhythm        5. Medication course changed             Clinical Decision Making: Patient has been maintaining sinus rhythm and his LV systolic function has normalized.  We will discontinue Eliquis at this time and continue aspirin.  He will keep his follow-up appointment as previously scheduled in November 2025.    Follow up : As previously scheduled 11/3/2025.        ILinette LPN am scribing for, and in the presence of Dr. Alida Pantoja MD, FACC.       I, Dr. Alida Pantoja MD, FACC, personally performed the services described in the documentation as scribed by Linette JEONG LPN in my presence, and confirm it is both accurate and complete.

## 2025-11-03 ENCOUNTER — APPOINTMENT (OUTPATIENT)
Dept: CARDIOLOGY | Facility: CLINIC | Age: 45
End: 2025-11-03
Payer: COMMERCIAL